# Patient Record
Sex: MALE | Race: WHITE | NOT HISPANIC OR LATINO | Employment: FULL TIME | URBAN - NONMETROPOLITAN AREA
[De-identification: names, ages, dates, MRNs, and addresses within clinical notes are randomized per-mention and may not be internally consistent; named-entity substitution may affect disease eponyms.]

---

## 2023-11-03 DIAGNOSIS — I25.10 CORONARY ARTERY DISEASE INVOLVING NATIVE CORONARY ARTERY OF NATIVE HEART, UNSPECIFIED WHETHER ANGINA PRESENT: ICD-10-CM

## 2023-11-03 DIAGNOSIS — Z95.5 STATUS POST INSERTION OF DRUG ELUTING CORONARY ARTERY STENT: ICD-10-CM

## 2023-11-03 DIAGNOSIS — E78.2 MIXED HYPERLIPIDEMIA: ICD-10-CM

## 2023-11-03 PROBLEM — I25.2 HISTORY OF NON-ST ELEVATION MYOCARDIAL INFARCTION (NSTEMI): Status: ACTIVE | Noted: 2023-11-03

## 2023-11-03 PROBLEM — I47.10 PSVT (PAROXYSMAL SUPRAVENTRICULAR TACHYCARDIA) (CMS-HCC): Status: ACTIVE | Noted: 2023-11-03

## 2023-11-03 PROBLEM — E87.1 HYPONATREMIA: Status: ACTIVE | Noted: 2023-11-03

## 2023-11-03 PROBLEM — M79.606 LEG PAIN: Status: ACTIVE | Noted: 2023-11-03

## 2023-11-03 PROBLEM — R53.83 FATIGUE: Status: ACTIVE | Noted: 2023-11-03

## 2023-11-03 PROBLEM — E78.5 HLD (HYPERLIPIDEMIA): Status: ACTIVE | Noted: 2023-11-03

## 2023-11-03 PROBLEM — I42.9 CARDIOMYOPATHY (MULTI): Status: ACTIVE | Noted: 2023-11-03

## 2023-11-03 PROBLEM — I10 HTN (HYPERTENSION): Status: ACTIVE | Noted: 2023-11-03

## 2023-11-03 PROBLEM — J44.9 COPD (CHRONIC OBSTRUCTIVE PULMONARY DISEASE) (MULTI): Status: ACTIVE | Noted: 2023-11-03

## 2023-11-03 RX ORDER — ATORVASTATIN CALCIUM 80 MG/1
1 TABLET, FILM COATED ORAL NIGHTLY
COMMUNITY
Start: 2021-08-30 | End: 2023-11-03 | Stop reason: SDUPTHER

## 2023-11-03 RX ORDER — SACUBITRIL AND VALSARTAN 24; 26 MG/1; MG/1
1 TABLET, FILM COATED ORAL 2 TIMES DAILY
COMMUNITY

## 2023-11-03 RX ORDER — FINASTERIDE 5 MG/1
1 TABLET, FILM COATED ORAL DAILY
COMMUNITY

## 2023-11-03 RX ORDER — TAMSULOSIN HYDROCHLORIDE 0.4 MG/1
1 CAPSULE ORAL 2 TIMES DAILY
COMMUNITY

## 2023-11-03 RX ORDER — DICLOFENAC SODIUM 75 MG/1
1 TABLET, DELAYED RELEASE ORAL 2 TIMES DAILY
COMMUNITY

## 2023-11-03 RX ORDER — METOPROLOL SUCCINATE 50 MG/1
50 TABLET, EXTENDED RELEASE ORAL DAILY
COMMUNITY
Start: 2021-08-30 | End: 2024-04-18

## 2023-11-03 RX ORDER — ATORVASTATIN CALCIUM 80 MG/1
80 TABLET, FILM COATED ORAL NIGHTLY
Qty: 90 TABLET | Refills: 3 | Status: SHIPPED | OUTPATIENT
Start: 2023-11-03

## 2023-11-03 RX ORDER — NITROGLYCERIN 0.4 MG/1
0.4 TABLET SUBLINGUAL EVERY 5 MIN PRN
COMMUNITY

## 2023-11-03 RX ORDER — ASPIRIN 81 MG/1
1 TABLET ORAL DAILY
COMMUNITY

## 2024-01-08 RX ORDER — LOSARTAN POTASSIUM 50 MG/1
50 TABLET ORAL DAILY
Qty: 90 TABLET | Refills: 3 | OUTPATIENT
Start: 2024-01-08

## 2024-01-19 DIAGNOSIS — I25.10 CORONARY ARTERY DISEASE INVOLVING NATIVE CORONARY ARTERY OF NATIVE HEART WITHOUT ANGINA PECTORIS: Primary | ICD-10-CM

## 2024-01-22 RX ORDER — IBUPROFEN 200 MG
1 TABLET ORAL DAILY
Qty: 28 PATCH | Refills: 0 | Status: SHIPPED | OUTPATIENT
Start: 2024-01-22 | End: 2024-02-29 | Stop reason: ALTCHOICE

## 2024-02-29 ENCOUNTER — OFFICE VISIT (OUTPATIENT)
Dept: CARDIOLOGY | Facility: CLINIC | Age: 69
End: 2024-02-29
Payer: COMMERCIAL

## 2024-02-29 VITALS
SYSTOLIC BLOOD PRESSURE: 134 MMHG | HEART RATE: 76 BPM | WEIGHT: 175 LBS | DIASTOLIC BLOOD PRESSURE: 62 MMHG | HEIGHT: 71 IN | BODY MASS INDEX: 24.5 KG/M2

## 2024-02-29 DIAGNOSIS — E78.5 HYPERLIPIDEMIA, UNSPECIFIED HYPERLIPIDEMIA TYPE: ICD-10-CM

## 2024-02-29 DIAGNOSIS — I25.10 CORONARY ARTERY DISEASE, UNSPECIFIED VESSEL OR LESION TYPE, UNSPECIFIED WHETHER ANGINA PRESENT, UNSPECIFIED WHETHER NATIVE OR TRANSPLANTED HEART: Primary | ICD-10-CM

## 2024-02-29 DIAGNOSIS — I42.9 CARDIOMYOPATHY, UNSPECIFIED TYPE (MULTI): ICD-10-CM

## 2024-02-29 DIAGNOSIS — Z95.5 STATUS POST INSERTION OF DRUG ELUTING CORONARY ARTERY STENT: ICD-10-CM

## 2024-02-29 DIAGNOSIS — I25.2 HISTORY OF NON-ST ELEVATION MYOCARDIAL INFARCTION (NSTEMI): ICD-10-CM

## 2024-02-29 DIAGNOSIS — I15.9 SECONDARY HYPERTENSION: ICD-10-CM

## 2024-02-29 PROBLEM — F17.200 CURRENT SMOKER: Status: ACTIVE | Noted: 2024-02-29

## 2024-02-29 PROCEDURE — 99214 OFFICE O/P EST MOD 30 MIN: CPT | Performed by: INTERNAL MEDICINE

## 2024-02-29 PROCEDURE — 1159F MED LIST DOCD IN RCRD: CPT | Performed by: INTERNAL MEDICINE

## 2024-02-29 PROCEDURE — 3075F SYST BP GE 130 - 139MM HG: CPT | Performed by: INTERNAL MEDICINE

## 2024-02-29 PROCEDURE — 3078F DIAST BP <80 MM HG: CPT | Performed by: INTERNAL MEDICINE

## 2024-02-29 PROCEDURE — 99406 BEHAV CHNG SMOKING 3-10 MIN: CPT | Performed by: INTERNAL MEDICINE

## 2024-02-29 PROCEDURE — 1160F RVW MEDS BY RX/DR IN RCRD: CPT | Performed by: INTERNAL MEDICINE

## 2024-02-29 NOTE — LETTER
"February 29, 2024     Jd Rdz MD  [address] [facility] [address]    Patient: Magen Carlisle   YOB: 1955   Date of Visit: 2/29/2024       Dear Dr. Jd Rdz MD:    Thank you for referring Magen Carlisle to me for evaluation. Below are my notes for this consultation.  If you have questions, please do not hesitate to call me. I look forward to following your patient along with you.       Sincerely,     Jaswant Lopes, DO      CC: No Recipients  ______________________________________________________________________________________    Subjective   Magen Carlisle is a 69 y.o. male       Chief Complaint    Annual Exam          69-year-old gentleman returns for follow-up he is doing well other than chronic dyspnea and shortness of breath and ongoing tobacco use.  We continue to  him on smoking cessation for 5 minutes today.    He has a history of ischemic cardiomyopathy with prior PCI of the ramus branch in 2021, follow-up heart catheterization in May 2023 revealed patent ramus stent with improved LV function of ejection fraction of 45%.  He was initiated on guideline directed medical therapies including Entresto and tolerating it well.  He was intolerant to nitroglycerin patch.    He has not had any hospitalizations for COPD or heart failure or angina nor any nitrate usage    Current NYHA classification is 2C.    Recommendations: Continue current therapies, follow-up in 1 year, tobacco cessation counseling.  Recent laboratories are reviewed from December revealing normal renal function and potassium and CBC.         Review of Systems   All other systems reviewed and are negative.           Vitals:    02/29/24 0854 02/29/24 0952   BP: 164/72 134/62   BP Location: Left arm Left arm   Patient Position: Sitting Sitting   Pulse: 76    Weight: 79.4 kg (175 lb)    Height: 1.803 m (5' 11\")         Objective   Physical Exam  Constitutional:       " Appearance: Normal appearance.   HENT:      Nose: Nose normal.   Neck:      Vascular: No carotid bruit.   Cardiovascular:      Rate and Rhythm: Normal rate.      Pulses: Normal pulses.      Heart sounds: Normal heart sounds.   Pulmonary:      Effort: Pulmonary effort is normal.   Abdominal:      General: Bowel sounds are normal.      Palpations: Abdomen is soft.   Musculoskeletal:         General: Normal range of motion.      Cervical back: Normal range of motion.      Right lower leg: No edema.      Left lower leg: No edema.   Skin:     General: Skin is warm and dry.   Neurological:      General: No focal deficit present.      Mental Status: He is alert.   Psychiatric:         Mood and Affect: Mood normal.         Behavior: Behavior normal.         Thought Content: Thought content normal.         Judgment: Judgment normal.         Allergies  Sulfamethoxazole     Current Medications    Current Outpatient Medications:   •  aspirin 81 mg EC tablet, Take 1 tablet (81 mg) by mouth once daily., Disp: , Rfl:   •  atorvastatin (Lipitor) 80 mg tablet, Take 1 tablet (80 mg) by mouth once daily at bedtime., Disp: 90 tablet, Rfl: 3  •  diclofenac (Voltaren) 75 mg EC tablet, Take 1 tablet (75 mg) by mouth 2 times a day., Disp: , Rfl:   •  finasteride (Proscar) 5 mg tablet, Take 1 tablet (5 mg) by mouth once daily., Disp: , Rfl:   •  metoprolol succinate XL (Toprol-XL) 50 mg 24 hr tablet, Take 1 tablet (50 mg) by mouth once daily., Disp: , Rfl:   •  nitroglycerin (Nitrostat) 0.4 mg SL tablet, Place 1 tablet (0.4 mg) under the tongue every 5 minutes if needed for chest pain., Disp: , Rfl:   •  sacubitriL-valsartan (Entresto) 24-26 mg tablet, Take 1 tablet by mouth 2 times a day., Disp: , Rfl:   •  tamsulosin (Flomax) 0.4 mg 24 hr capsule, Take 1 capsule (0.4 mg) by mouth 2 times a day., Disp: , Rfl:      No medication list or bottles at visit today. Updated verbally with patient.                  Assessment/Plan   1. Coronary  artery disease, unspecified vessel or lesion type, unspecified whether angina present, unspecified whether native or transplanted heart        2. Cardiomyopathy, unspecified type (CMS/HCC)        3. History of non-ST elevation myocardial infarction (NSTEMI)        4. Hyperlipidemia, unspecified hyperlipidemia type        5. Secondary hypertension        6. Status post insertion of drug eluting coronary artery stent                 Scribe Attestation  By signing my name below, Cindy BUSBY LPN, Scribe   attest that this documentation has been prepared under the direction and in the presence of Jaswant Lopes DO.     Provider Attestation - Scribe documentation    All medical record entries made by the Scribe were at my direction and personally dictated by me. I have reviewed the chart and agree that the record accurately reflects my personal performance of the history, physical exam, discussion and plan.

## 2024-02-29 NOTE — PROGRESS NOTES
"Subjective   Magen Carlisle is a 69 y.o. male       Chief Complaint    Annual Exam          69-year-old gentleman returns for follow-up he is doing well other than chronic dyspnea and shortness of breath and ongoing tobacco use.  We continue to  him on smoking cessation for 5 minutes today.    He has a history of ischemic cardiomyopathy with prior PCI of the ramus branch in 2021, follow-up heart catheterization in May 2023 revealed patent ramus stent with improved LV function of ejection fraction of 45%.  He was initiated on guideline directed medical therapies including Entresto and tolerating it well.  He was intolerant to nitroglycerin patch.    He has not had any hospitalizations for COPD or heart failure or angina nor any nitrate usage    Current NYHA classification is 2C.    Recommendations: Continue current therapies, follow-up in 1 year, tobacco cessation counseling.  Recent laboratories are reviewed from December revealing normal renal function and potassium and CBC.         Review of Systems   All other systems reviewed and are negative.           Vitals:    02/29/24 0854 02/29/24 0952   BP: 164/72 134/62   BP Location: Left arm Left arm   Patient Position: Sitting Sitting   Pulse: 76    Weight: 79.4 kg (175 lb)    Height: 1.803 m (5' 11\")         Objective   Physical Exam  Constitutional:       Appearance: Normal appearance.   HENT:      Nose: Nose normal.   Neck:      Vascular: No carotid bruit.   Cardiovascular:      Rate and Rhythm: Normal rate.      Pulses: Normal pulses.      Heart sounds: Normal heart sounds.   Pulmonary:      Effort: Pulmonary effort is normal.   Abdominal:      General: Bowel sounds are normal.      Palpations: Abdomen is soft.   Musculoskeletal:         General: Normal range of motion.      Cervical back: Normal range of motion.      Right lower leg: No edema.      Left lower leg: No edema.   Skin:     General: Skin is warm and dry.   Neurological:      General: No focal " deficit present.      Mental Status: He is alert.   Psychiatric:         Mood and Affect: Mood normal.         Behavior: Behavior normal.         Thought Content: Thought content normal.         Judgment: Judgment normal.         Allergies  Sulfamethoxazole     Current Medications    Current Outpatient Medications:     aspirin 81 mg EC tablet, Take 1 tablet (81 mg) by mouth once daily., Disp: , Rfl:     atorvastatin (Lipitor) 80 mg tablet, Take 1 tablet (80 mg) by mouth once daily at bedtime., Disp: 90 tablet, Rfl: 3    diclofenac (Voltaren) 75 mg EC tablet, Take 1 tablet (75 mg) by mouth 2 times a day., Disp: , Rfl:     finasteride (Proscar) 5 mg tablet, Take 1 tablet (5 mg) by mouth once daily., Disp: , Rfl:     metoprolol succinate XL (Toprol-XL) 50 mg 24 hr tablet, Take 1 tablet (50 mg) by mouth once daily., Disp: , Rfl:     nitroglycerin (Nitrostat) 0.4 mg SL tablet, Place 1 tablet (0.4 mg) under the tongue every 5 minutes if needed for chest pain., Disp: , Rfl:     sacubitriL-valsartan (Entresto) 24-26 mg tablet, Take 1 tablet by mouth 2 times a day., Disp: , Rfl:     tamsulosin (Flomax) 0.4 mg 24 hr capsule, Take 1 capsule (0.4 mg) by mouth 2 times a day., Disp: , Rfl:      No medication list or bottles at visit today. Updated verbally with patient.                  Assessment/Plan   1. Coronary artery disease, unspecified vessel or lesion type, unspecified whether angina present, unspecified whether native or transplanted heart        2. Cardiomyopathy, unspecified type (CMS/HCC)        3. History of non-ST elevation myocardial infarction (NSTEMI)        4. Hyperlipidemia, unspecified hyperlipidemia type        5. Secondary hypertension        6. Status post insertion of drug eluting coronary artery stent                 Scribe Attestation  By signing my name below, I, Cindy ESTEVES LPN , Scribe   attest that this documentation has been prepared under the direction and in the presence of Jaswant Lopes DO.      Provider Attestation - Scribe documentation    All medical record entries made by the Scribe were at my direction and personally dictated by me. I have reviewed the chart and agree that the record accurately reflects my personal performance of the history, physical exam, discussion and plan.

## 2024-04-14 DIAGNOSIS — I47.10 PSVT (PAROXYSMAL SUPRAVENTRICULAR TACHYCARDIA) (CMS-HCC): ICD-10-CM

## 2024-04-14 DIAGNOSIS — I10 ESSENTIAL (PRIMARY) HYPERTENSION: ICD-10-CM

## 2024-04-15 RX ORDER — ALBUTEROL SULFATE 0.83 MG/ML
SOLUTION RESPIRATORY (INHALATION)
COMMUNITY
Start: 2024-03-27

## 2024-04-18 RX ORDER — METOPROLOL SUCCINATE 50 MG/1
50 TABLET, EXTENDED RELEASE ORAL DAILY
Qty: 90 TABLET | Refills: 3 | Status: SHIPPED | OUTPATIENT
Start: 2024-04-18

## 2025-03-05 ENCOUNTER — APPOINTMENT (OUTPATIENT)
Dept: CARDIOLOGY | Facility: CLINIC | Age: 70
End: 2025-03-05
Payer: COMMERCIAL